# Patient Record
Sex: MALE | Race: WHITE | NOT HISPANIC OR LATINO | Employment: OTHER | URBAN - METROPOLITAN AREA
[De-identification: names, ages, dates, MRNs, and addresses within clinical notes are randomized per-mention and may not be internally consistent; named-entity substitution may affect disease eponyms.]

---

## 2018-02-01 ENCOUNTER — ESTABLISHED COMPREHENSIVE EXAM (OUTPATIENT)
Dept: URBAN - METROPOLITAN AREA CLINIC 43 | Facility: CLINIC | Age: 83
End: 2018-02-01

## 2018-02-01 DIAGNOSIS — Z96.1: ICD-10-CM

## 2018-02-01 DIAGNOSIS — H43.813: ICD-10-CM

## 2018-02-01 DIAGNOSIS — H04.123: ICD-10-CM

## 2018-02-01 PROCEDURE — G8427 DOCREV CUR MEDS BY ELIG CLIN: HCPCS

## 2018-02-01 PROCEDURE — 92014 COMPRE OPH EXAM EST PT 1/>: CPT

## 2018-02-01 PROCEDURE — 92015 DETERMINE REFRACTIVE STATE: CPT

## 2018-02-01 PROCEDURE — G8785 BP SCRN NO PERF AT INTERVAL: HCPCS

## 2018-02-01 PROCEDURE — 1036F TOBACCO NON-USER: CPT

## 2018-02-01 ASSESSMENT — VISUAL ACUITY
OS_SC: 20/30
OD_SC: <J12
OD_CC: J1
OS_CC: J1
OD_SC: 20/40-2
OS_SC: <J12

## 2018-02-01 ASSESSMENT — TONOMETRY
OD_IOP_MMHG: 12
OS_IOP_MMHG: 13

## 2019-02-04 ENCOUNTER — ESTABLISHED COMPREHENSIVE EXAM (OUTPATIENT)
Dept: URBAN - METROPOLITAN AREA CLINIC 43 | Facility: CLINIC | Age: 84
End: 2019-02-04

## 2019-02-04 DIAGNOSIS — Z96.1: ICD-10-CM

## 2019-02-04 DIAGNOSIS — H43.813: ICD-10-CM

## 2019-02-04 DIAGNOSIS — H04.123: ICD-10-CM

## 2019-02-04 PROCEDURE — 92015 DETERMINE REFRACTIVE STATE: CPT

## 2019-02-04 PROCEDURE — 92014 COMPRE OPH EXAM EST PT 1/>: CPT

## 2019-02-04 ASSESSMENT — VISUAL ACUITY
OS_SC: J12
OD_BAT: 20/100
OD_PH: 20/40-2
OD_SC: 20/50+2
OD_SC: J12
OS_SC: 20/30
OS_CC: J2
OD_CC: J3
OS_BAT: 20/70

## 2019-02-04 ASSESSMENT — TONOMETRY
OS_IOP_MMHG: 14
OD_IOP_MMHG: 14

## 2019-02-13 ENCOUNTER — EST. PATIENT EMERGENCY (OUTPATIENT)
Dept: URBAN - METROPOLITAN AREA CLINIC 43 | Facility: CLINIC | Age: 84
End: 2019-02-13

## 2019-02-13 DIAGNOSIS — H43.813: ICD-10-CM

## 2019-02-13 DIAGNOSIS — H04.123: ICD-10-CM

## 2019-02-13 DIAGNOSIS — Z96.1: ICD-10-CM

## 2019-02-13 PROCEDURE — 92012 INTRM OPH EXAM EST PATIENT: CPT

## 2019-02-13 RX ORDER — NEOMYCIN SULFATE, POLYMYXIN B SULFATE AND DEXAMETHASONE 3.5; 10000; 1 MG/G; [USP'U]/G; MG/G: OINTMENT OPHTHALMIC EVERY EVENING

## 2019-02-13 ASSESSMENT — VISUAL ACUITY
OS_SC: 20/40-1
OD_SC: 20/60+2

## 2019-02-13 ASSESSMENT — TONOMETRY
OD_IOP_MMHG: 18
OS_IOP_MMHG: 1

## 2020-02-05 ENCOUNTER — ESTABLISHED COMPREHENSIVE EXAM (OUTPATIENT)
Dept: URBAN - METROPOLITAN AREA CLINIC 43 | Facility: CLINIC | Age: 85
End: 2020-02-05

## 2020-02-05 DIAGNOSIS — H43.813: ICD-10-CM

## 2020-02-05 DIAGNOSIS — H52.4: ICD-10-CM

## 2020-02-05 DIAGNOSIS — H04.123: ICD-10-CM

## 2020-02-05 PROCEDURE — 92014 COMPRE OPH EXAM EST PT 1/>: CPT

## 2020-02-05 PROCEDURE — 92015 DETERMINE REFRACTIVE STATE: CPT

## 2020-02-05 ASSESSMENT — TONOMETRY
OS_IOP_MMHG: 12
OD_IOP_MMHG: 12

## 2020-02-05 ASSESSMENT — VISUAL ACUITY
OD_SC: J12
OS_SC: J10
OS_CC: J2
OS_SC: 20/25-2
OD_SC: 20/25+1
OD_CC: J1-

## 2021-10-05 NOTE — PATIENT DISCUSSION
Patient educated to discontinue contact lens wear 1 day prior to surgery; DWSCL have been out x 2 weeks.

## 2021-10-05 NOTE — PATIENT DISCUSSION
Custom monovision helps to lessen the dependence on glasses, but is a compromise and glasses are often still needed for some activities including driving, binocular vision, and intermediate area activities. The patient understands there is a possibility they may need an enhancement after surgery. The patient elects Custom Vision OD, goal of emmetropia.

## 2021-10-13 NOTE — PATIENT DISCUSSION
Cataract surgery has been performed in the first eye and activities of daily living are still impaired. The patient would like to proceed with cataract surgery in the second eye as scheduled. The patient elects Custom Monovision OS, goal of -1.75/-2.00.

## 2022-02-17 NOTE — PROCEDURE NOTE: CLINICAL
PROCEDURE NOTE: Removal of Conjunctival FB, Superficial #1 OS. 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 Anesthesia: Topical. Prior to treatment, the risks/benefits/alternatives were discussed. The patient wished to proceed with procedure. Superficial conjunctival FB removed with forcep/needle. Globe and conjunctiva intact. Patient tolerated procedure well. There were no complications. Post procedure instructions given. Atul Urban
